# Patient Record
Sex: FEMALE
[De-identification: names, ages, dates, MRNs, and addresses within clinical notes are randomized per-mention and may not be internally consistent; named-entity substitution may affect disease eponyms.]

---

## 2022-11-15 ENCOUNTER — APPOINTMENT (OUTPATIENT)
Dept: GASTROENTEROLOGY | Facility: CLINIC | Age: 27
End: 2022-11-15

## 2022-12-14 ENCOUNTER — APPOINTMENT (OUTPATIENT)
Dept: GASTROENTEROLOGY | Facility: CLINIC | Age: 27
End: 2022-12-14

## 2022-12-14 ENCOUNTER — LABORATORY RESULT (OUTPATIENT)
Age: 27
End: 2022-12-14

## 2022-12-14 DIAGNOSIS — R19.7 DIARRHEA, UNSPECIFIED: ICD-10-CM

## 2022-12-14 DIAGNOSIS — Z00.00 ENCOUNTER FOR GENERAL ADULT MEDICAL EXAMINATION W/OUT ABNORMAL FINDINGS: ICD-10-CM

## 2022-12-14 PROCEDURE — 99203 OFFICE O/P NEW LOW 30 MIN: CPT | Mod: 95

## 2022-12-14 NOTE — PHYSICAL EXAM
[Normal] : alert, normal voice/communication, healthy appearing, no acute distress [Oriented To Time, Place, And Person] : oriented to person, place, and time

## 2022-12-14 NOTE — ASSESSMENT
[FreeTextEntry1] : 26 yo F no significant PMH who presents for evaluation of 3 weeks of diarrhea, abdominal discomfort, bloating. \par \par #Diarrhea (worse after eating). Differential is broad and includes infectious etiologies, post infectious IBS. Less likely microscopic colitis (no new medications). Unlikely to be IBD. \par #Occasional blood on TP, likely hemorrhoids\par #Bloating, abdominal discomfort\par - check stool studies\par - labs\par - if above unrevealing consider colonoscopy for structural evaluation \par \par Follow up in 2-3 weeks

## 2022-12-14 NOTE — REVIEW OF SYSTEMS
[Abdominal Pain] : abdominal pain [Vomiting] : no vomiting [Constipation] : no constipation [Diarrhea] : diarrhea [Heartburn] : no heartburn [Melena (black stool)] : no melena [Bleeding] : no bleeding [Fecal Incontinence (soiling)] : no fecal incontinence [Bloating (gassiness)] : bloating [Negative] : Heme/Lymph [FreeTextEntry7] : No fecal incontinence but if not near a toilet, fearful could have an incident

## 2022-12-14 NOTE — HISTORY OF PRESENT ILLNESS
[FreeTextEntry1] : 28 yo F no significant PMH who presents for evaluation of 3 weeks of diarrhea. \par \par Patient has been having issues with her bowel movements\par Has been having watery diarrhea and it has been 3 weeks\par Thought she had a stomach bug\par A little worse when eats something\par Feels food isn't digesting correctly\par A lot of discomfort and abdominal pain. \par \par Prior to 3 weeks ago BMs normal. Last month was out for 10 days because had COVID. BM issues started 1-2 weeks after that. Did not have diarrhea while sick. \par \par It is brown and very watery. Not mushy, no consistency. \par \par Prior to this was having normal BMs. \par Prior to this would have abdominal pain, but not so bothersome that she needed to see doctor. Was in midsection and would come and go. Would get bad cramps before passing gas/feeling gassy. \par These past few weeks it has been a little worse. \par Pain is near her belly button. It does improve with a BM. \par She has not had an appetite for the past 2 weeks. Even she she tries to eat more bland foods (white rice/bread), that didn't help. When she does eat, within 10 min stomach starts growling, feels discomfort and has to run to the bathroom, can't hold it. She feels better after using bathroom. Still dealing with bloating and discomfort throughout the day, worse with eating. \par \par Sometimes keeps her up at night, the abdominal pain. It is more a discomfort. She has trouble sleeping and feels she has been sleeping less because she wakes up at night to have diarrhea. Has a hard time falling asleep after that. \par \par Does have hemorrhoids from using the bathroom so much, gas seen a little blood, not too much. \par Decrease in appetite, but no significant weight loss. \par Takes a few bites and feels full quickly. \par \par No nausea, no heartburn\par No fevers, chills \par Sometimes feels a little warm \par \par Sometimes body aches intermittently, feels intermittent fatigue. \par \par No new vitamins  supplements\par Has been taking tylenol for consistently migraines every few days, which also started 3 weeks ago\par \par Bowel movements are small volume, but can be in between. Lately has been smaller (has decreased). \par \par Has approximately 4-5BM/day. When she urinates, will have a BM at the same time. She can't tell when it's coming. If she's eating knows she will need to use the bathroom. \par It will be unexpected, won't necessarily have the urge. \par For the most part she tries to make it to the bathroom in time, but if waited longer. \par No recent travel, no sick contacts with similar symptoms. \par \par She does feel it is triggered a lot more with dairy. Trying to avoid eating a lot of dairy. But it happens with anything. \par Even with pasta, rice, something bland will still happen\par Labs: \par celiac\par TSH \par CBC\par BMP \par \par Stool studies \par \par \par PMH: \par Denies\par \par PSH: \par denies\par \par FH: \par Denies FHx of GI malignancy that she is aware of (grandmother with breast cancer) \par Denies any FHx of IBD\par \par SH: \par never smoker\par social EtOH (rare, on occasions like new years) \par No MJ use\par no illicit drug use\par \par MEDS: \par denies\par \par ALL: NKDA\par

## 2022-12-15 LAB
ALBUMIN SERPL ELPH-MCNC: 4.4 G/DL
ALP BLD-CCNC: 72 U/L
ALT SERPL-CCNC: 20 U/L
ANION GAP SERPL CALC-SCNC: 12 MMOL/L
AST SERPL-CCNC: 19 U/L
BASOPHILS # BLD AUTO: 0.03 K/UL
BASOPHILS NFR BLD AUTO: 0.6 %
BILIRUB SERPL-MCNC: 0.2 MG/DL
BUN SERPL-MCNC: 8 MG/DL
CALCIUM SERPL-MCNC: 9.4 MG/DL
CHLORIDE SERPL-SCNC: 106 MMOL/L
CO2 SERPL-SCNC: 24 MMOL/L
CREAT SERPL-MCNC: 0.69 MG/DL
EGFR: 123 ML/MIN/1.73M2
EOSINOPHIL # BLD AUTO: 0.04 K/UL
EOSINOPHIL NFR BLD AUTO: 0.8 %
GLUCOSE SERPL-MCNC: 129 MG/DL
HCT VFR BLD CALC: 36.1 %
HGB BLD-MCNC: 11.5 G/DL
IMM GRANULOCYTES NFR BLD AUTO: 0.2 %
LYMPHOCYTES # BLD AUTO: 1.27 K/UL
LYMPHOCYTES NFR BLD AUTO: 24 %
MAGNESIUM SERPL-MCNC: 2.1 MG/DL
MAN DIFF?: NORMAL
MCHC RBC-ENTMCNC: 26.4 PG
MCHC RBC-ENTMCNC: 31.9 GM/DL
MCV RBC AUTO: 83 FL
MONOCYTES # BLD AUTO: 0.22 K/UL
MONOCYTES NFR BLD AUTO: 4.2 %
NEUTROPHILS # BLD AUTO: 3.73 K/UL
NEUTROPHILS NFR BLD AUTO: 70.2 %
PLATELET # BLD AUTO: 389 K/UL
POTASSIUM SERPL-SCNC: 3.8 MMOL/L
PROT SERPL-MCNC: 6.8 G/DL
RBC # BLD: 4.35 M/UL
RBC # FLD: 15.7 %
SODIUM SERPL-SCNC: 142 MMOL/L
TSH SERPL-ACNC: 1.21 UIU/ML
WBC # FLD AUTO: 5.3 K/UL

## 2023-12-27 LAB
BACTERIA STL CULT: NORMAL
CELIAC DISEASE INTERPRETATION: NORMAL
CELIAC GENE PAIRS PRESENT: NO
DEPRECATED O AND P PREP STL: NORMAL
DQ ALPHA 1: NORMAL
DQ BETA 1: NORMAL
G LAMBLIA AG STL QL: NORMAL
GI PCR PANEL: NOT DETECTED
IMMUNOGLOBULIN A (IGA): 77 MG/DL

## 2025-08-29 ENCOUNTER — APPOINTMENT (OUTPATIENT)
Dept: GASTROENTEROLOGY | Facility: CLINIC | Age: 30
End: 2025-08-29